# Patient Record
Sex: FEMALE | Race: WHITE | URBAN - METROPOLITAN AREA
[De-identification: names, ages, dates, MRNs, and addresses within clinical notes are randomized per-mention and may not be internally consistent; named-entity substitution may affect disease eponyms.]

---

## 2018-02-05 ENCOUNTER — OFFICE VISIT (OUTPATIENT)
Dept: OBGYN | Facility: CLINIC | Age: 28
End: 2018-02-05
Payer: COMMERCIAL

## 2018-02-05 VITALS
BODY MASS INDEX: 22.88 KG/M2 | WEIGHT: 134 LBS | HEIGHT: 64 IN | DIASTOLIC BLOOD PRESSURE: 68 MMHG | SYSTOLIC BLOOD PRESSURE: 112 MMHG

## 2018-02-05 DIAGNOSIS — N89.8 VAGINAL DRYNESS: ICD-10-CM

## 2018-02-05 DIAGNOSIS — N93.8 DUB (DYSFUNCTIONAL UTERINE BLEEDING): Primary | ICD-10-CM

## 2018-02-05 DIAGNOSIS — Z30.011 VISIT FOR ORAL CONTRACEPTIVE PRESCRIPTION: ICD-10-CM

## 2018-02-05 DIAGNOSIS — R68.82 DECREASED LIBIDO: ICD-10-CM

## 2018-02-05 DIAGNOSIS — Z30.46 NEXPLANON REMOVAL: ICD-10-CM

## 2018-02-05 PROCEDURE — 99214 OFFICE O/P EST MOD 30 MIN: CPT | Mod: 25 | Performed by: OBSTETRICS & GYNECOLOGY

## 2018-02-05 PROCEDURE — 11982 REMOVE DRUG IMPLANT DEVICE: CPT | Performed by: OBSTETRICS & GYNECOLOGY

## 2018-02-05 RX ORDER — LEVONORGESTREL AND ETHINYL ESTRADIOL 0.15-0.03
1 KIT ORAL DAILY
Qty: 84 TABLET | Refills: 4 | Status: SHIPPED | OUTPATIENT
Start: 2018-02-05

## 2018-02-05 NOTE — MR AVS SNAPSHOT
"              After Visit Summary   2/5/2018    Leyla Cross    MRN: [id number]           Patient Information     Date Of Birth          1990        Visit Information        Provider Department      2/5/2018 9:00 AM Sandy Schuler DO [facility]        Today's Diagnoses     DUB (dysfunctional uterine bleeding)    -  1    Nexplanon removal        Visit for oral contraceptive prescription        Vaginal dryness        Decreased libido           Follow-ups after your visit        Follow-up notes from your care team     Return in about 3 months (around 5/5/2018).      Who to contact     If you have questions or need follow up information about today's clinic visit or your schedule please contact [facility] directly at [phone].  Normal or non-critical lab and imaging results will be communicated to you by Kaboodlehart, letter or phone within 4 business days after the clinic has received the results. If you do not hear from us within 7 days, please contact the clinic through Kaboodlehart or phone. If you have a critical or abnormal lab result, we will notify you by phone as soon as possible.  Submit refill requests through KitNipBox or call your pharmacy and they will forward the refill request to us. Please allow 3 business days for your refill to be completed.          Additional Information About Your Visit        MyChart Information     KitNipBox lets you send messages to your doctor, view your test results, renew your prescriptions, schedule appointments and more. To sign up, go to [email or web address] . Click on \"Log in\" on the left side of the screen, which will take you to the Welcome page. Then click on \"Sign up Now\" on the right side of the page.     You will be asked to enter the access code listed below, as well as some personal information. Please follow the directions to create your username and password.     Your access code is: [email or web address]  Expires: " "2018 10:32 AM     Your access code will  in 90 days. If you need help or a new code, please call your [facility] clinic or [phone].        Care EveryWhere ID     This is your Care EveryWhere ID. This could be used by other organizations to access your [facility] medical records  [id number]        Your Vitals Were     Height BMI (Body Mass Index)                5' 4\" (1.626 m) 23 kg/m2           Blood Pressure from Last 3 Encounters:   18 112/68   10/17/16 122/72   16 118/70    Weight from Last 3 Encounters:   18 134 lb (60.8 kg)   10/17/16 126 lb (57.2 kg)   16 124 lb (56.2 kg)              We Performed the Following     REMOVAL NON-BIODEGRADABLE DRUG DELIVERY IMPLANT          Today's Medication Changes          These changes are accurate as of 18 10:33 AM.  If you have any questions, ask your nurse or doctor.               Start taking these medicines.        Dose/Directions    levonorgestrel-ethinyl estradiol 0.15-30 MG-MCG per tablet   Commonly known as:  BROOKE   Used for:  Visit for oral contraceptive prescription   Started by:  Sandy Schuler,         Dose:  1 tablet   Take 1 tablet by mouth daily   Quantity:  84 tablet   Refills:  4            Where to get your medicines      These medications were sent to [id number] [address] [address]     Phone:  [phone]     levonorgestrel-ethinyl estradiol 0.15-30 MG-MCG per tablet                Primary Care Provider Office Phone # Fax #    [facility] [phone]       [facility] [address] [facility] [address]  [facility] [address]        Equal Access to Services     ELOY WADSWORTH AH: Reyna cadeto Sorachel, waaxda luqadaha, qaybta kaalmada adelenka, santos patterson. So [facility] [phone].    ATENCIÓN: Si habla español, tiene a morales disposición " servicios gratuitos de asistencia lingüística. Jeet snow [phone].    We comply with applicable federal civil rights laws and [place] laws. We do not discriminate on the basis of race, color, national origin, age, disability, sex, sexual orientation, or gender identity.            Thank you!     Thank you for choosing [facility]  for your care. Our goal is always to provide you with excellent care. Hearing back from our patients is one way we can continue to improve our services. Please take a few minutes to complete the written survey that you may receive in the mail after your visit with us. Thank you!             Your Updated Medication List - Protect others around you: Learn how to safely use, store and throw away your medicines at www.disposemymeds.org.          This list is accurate as of 2/5/18 10:33 AM.  Always use your most recent med list.                   Brand Name Dispense Instructions for use Diagnosis    etonogestrel 68 MG Impl    IMPLANON/NEXPLANON     1 each by Subdermal route once        levonorgestrel-ethinyl estradiol 0.15-30 MG-MCG per tablet    BROOKE    84 tablet    Take 1 tablet by mouth daily    Visit for oral contraceptive prescription

## 2018-02-05 NOTE — PROGRESS NOTES
SUBJECTIVE:                                                   Leyla Cross is a 27 year old female who presents to clinic today for the following health issue(s):  Patient presents with:  Vaginal Problem: patient is following up on vaginal dryness. has changed diet but has not seen any changes. patient also c/o constant spotting on Nexplanon-possible removal.         HPI:  Having vaginal dryness. Yogurt, probiotics has not changed.   Feels like she does not lubricate with intercourse. Low libido. Occ discomfort with intercourse. Has had better libido in past. Uses lubricant with intercourse and occ during the day.  Did not trial the estrogen cream.     Having spotting and then period and repeats the pattern every week.   Wears pads/liners.   Uses aveeno body wash 5-6x/wk. No feminine wipes/baby wipes.      No hx heavy periods. When was teen had a lot of cramps, which improved as got older. Started OCPs at 18 due to period interruption with sports. Did depo provera for some time, then back to OCPs. Was then single for a while and was off. Periods were somewhat alternating between heavy and light flow. Had mirena IUD and had recurrent vaginitis.   Is over the spotting, wants to go to OCPs.  Does NOT want to get pregnant soon. Does pullout as well as Nexplanon.     Working full time and going to school. Feels stress in life.   No hx sexual abuse.    Review of PMH, SocHx, SurHx, FHx, medications completed. Epic updated.      No LMP recorded. Patient is not currently having periods (Reason: Birth Control)..   Patient is sexually active, .  Using Nexplanon for contraception.    reports that she has quit smoking. She has never used smokeless tobacco.    STD testing offered?  Declined    Health maintenance updated:  yes    Today's PHQ-2 Score: No flowsheet data found.  Today's PHQ-9 Score:   PHQ-9 SCORE 2016   Total Score 4     Today's TEODORO-7 Score:   TEODORO-7 SCORE 2016   Total Score 7       Problem list  "and histories reviewed & adjusted, as indicated.  Additional history: as documented.    Patient Active Problem List   Diagnosis     Nexplanon in place     Past Surgical History:   Procedure Laterality Date     Nexplanon        Social History   Substance Use Topics     Smoking status: Former Smoker     Smokeless tobacco: Never Used     Alcohol use No           Current Outpatient Prescriptions   Medication Sig     etonogestrel (IMPLANON/NEXPLANON) 68 MG IMPL 1 each by Subdermal route once     levonorgestrel-ethinyl estradiol (NORDETTE) 0.15-30 MG-MCG per tablet Take 1 tablet by mouth daily     No current facility-administered medications for this visit.      No Known Allergies    ROS:  12 point review of systems negative other than symptoms noted below.  Genitourinary: Painful [place], Spotting and Vaginal Dryness  Endocrine: Decreased Libido  Psychiatric: Anxiety    OBJECTIVE:     /68  Ht 5' 4\" (1.626 m)  Wt 134 lb (60.8 kg)  BMI 23 kg/m2  Body mass index is 23 kg/(m^2).    Exam:  Constitutional:  Appearance: Well nourished, well developed alert, in no acute distress  Chest:  Respiratory Effort:  Breathing unlabored  Lymphatic: Lymph Nodes:  No other lymphadenopathy present  Skin:General Inspection:  No rashes present, no lesions present, no areas of discoloration; Genitalia and Groin:  No rashes present, no lesions present, no areas of discoloration, no masses present.  Neurologic/Psychiatric:  Mental Status:  Oriented X3   Pelvic Exam:  External Genitalia:     Normal appearance for age, no discharge present, no tenderness present, no inflammatory lesions present, color normal  Vagina:     Normal vaginal vault without central or paravaginal defects, SMALL DARK BLOOD IN VAULT, no inflammatory lesions present, no masses present  Bladder:     Nontender to palpation  Urethra:   Urethral Body:  Urethra palpation normal, urethra structural support normal   Urethral Meatus:  No erythema or lesions " present  Cervix:     Appearance healthy, no lesions present, nontender to palpation, no bleeding present  Perineum:     Perineum within normal limits, no evidence of trauma, no rashes or skin lesions present  Anus:     Anus within normal limits, no hemorrhoids present  Inguinal Lymph Nodes:     No lymphadenopathy present  Pubic Hair:     Normal pubic hair distribution for age  Genitalia and Groin:     No rashes present, no lesions present, no areas of discoloration, no masses present         ASSESSMENT/PLAN:                                                        ICD-10-CM    1. DUB (dysfunctional uterine bleeding) N93.8 REMOVAL NON-BIODEGRADABLE DRUG DELIVERY IMPLANT   2. Nexplanon removal Z30.46 REMOVAL NON-BIODEGRADABLE DRUG DELIVERY IMPLANT   3. Visit for oral contraceptive prescription Z30.011 levonorgestrel-ethinyl estradiol (NORDETTE) 0.15-30 MG-MCG per tablet   4. Vaginal dryness N89.8    5. Decreased libido R68.82          -DUB, 2/2 Nexplanon. Discussed ways to try OCP taper, for example, to stop bleeding but is unlikely to be successful long term. Pt desires Nexplanon out and start OCPs. Has tried many different meds in past as noted above with various SE.  Discussed Paragard IUD for contraception without the potential hormonal SE/risks, emily in light of her complaints. However, she wants OCPs. Removing constant spotting/bleeding should eliminate excess vaginal irritation. Discussed cleansing, is not over doing it as it is.   Reviweed risks and benefits of OCPS, including VTE risk. Start right away. Back up x 1wk.  -Libido low. Discussed potential med involvement but also differing life situations-stress of working full time and school, for example- which may impact. Will refer to sexual med clinic at the .  -Vaginal dryness. May be exacerbated by Nexplanon and spotting. Will see how these change. Never did the trial vaginal estrogen. Will hold off while changing contraceptives. May revisit in 3mo. May try  RepHresh. Cont lubricant use.   -See Nexplanon removal note below.     Sandy Schuler,   [facility]            INDICATIONS:                                                      Leyla is here today for removal of Nexplanon contraceptive implant.     Is a pregnancy test required: No.  Was a consent obtained?  Yes    Today's PHQ-2 Score: No flowsheet data found.    PROCEDURE:                                                      Patient was placed in dorsal supine position with left arm abducted and externally rotated. Nexplanon was palpated under skin. The area was cleansed with betadine. The distal site was injected with 2 ml of 1% plain lidocaine.  While pushing down on the proximal end, 2 mm incision was made over the distal implant with a scalpel. The implant was grasped with a mosquito forceps and removed intact. The skin was closed with Steristrip. A pressure bandage was placed for the next 12-24 hours.  She tolerated the procedure well. There were no complications. Patient was discharged in stable condition.    Call if bleeding, pain or fever occur., Birth control counseling given. and Will start OCPs for contraception. Backup x 1wk    Sandy Schuler, DO

## 2019-02-13 ENCOUNTER — TELEPHONE (OUTPATIENT)
Dept: OBGYN | Facility: CLINIC | Age: 29
End: 2019-02-13

## 2019-02-13 NOTE — LETTER
March 1, 2019      Leyla Cross  [address]   [place] [address]        Dear Leyla,       You were seen in our office on 2/5/2018 . We are reaching out to you because we do not have record of a pap test in the [facility] system. We track this to make sure you are getting proper screening for cervical cancers.     If you had this at another clinic outside of [facility], we ask that you call back to relay this information; where you had this test, approximate date and the result.     Otherwise we ask that you please make an appointment with our clinic for an exam/annual, or let us know if you will be seeing another clinic for our tracking purposes.        Sincerely,        Sandy Lopes Masters, DO

## 2019-02-13 NOTE — LETTER
February 13, 2019      Leyla Cross  [address]   [place] [address]        Dear Leyla,     You were seen in our office on 2/5/2018 . We are reaching out to you because we do not have record of a pap test in the [facility] system. We track this to make sure you are getting proper screening for cervical. We also do not see an updated annual exam and in order to refill your prescriptions you must have an up-to-date annual and pap smear.    If you had this at another clinic outside of [facility], we ask that you call back to relay this information; where you had this test, approximate date and the result.     Otherwise we ask that you please make an appointment with our clinic for an exam, or let us know if you will be seeing another clinic for our tracking purposes.              Sincerely,        Sandy Lopes Masters, DO

## 2019-02-13 NOTE — TELEPHONE ENCOUNTER
Panel Management Review    Date of last visit with a [facility] provider: Masters on 2/5/18.  Date of next visit with a [facility] provider: None.    Health Maintenance List    Health Maintenance   Topic Date Due     HPV IMMUNIZATION (1 - Female 3-dose series) 05/28/2003     DEPRESSION ACTION PLAN  05/28/2010     HIV SCREEN (SYSTEM ASSIGNED)  05/28/2010     PAP SCREENING Q3 YR (SYSTEM ASSIGNED)  05/28/2013     PHQ-9 Q6 MONTHS  03/26/2017     DTAP/TDAP/TD IMMUNIZATION (1 - Tdap) 05/28/2017     INFLUENZA VACCINE (1) 09/01/2018     ZOSTER IMMUNIZATION (1 of 2) 05/28/2042     IPV IMMUNIZATION  Aged Out     MENINGITIS IMMUNIZATION  Aged Out       Composite cancer screening  Chart review shows that this patient is due/due soon for the following Pap Smear  No results found for: PAP  Past Surgical History:   Procedure Laterality Date     Nexplanon         Is hysterectomy listed in surgical history? No   Is mastectomy listed in surgical history? No     Summary:    Patient is due/failing the following:   Pap Smear    Action needed: Routed to provider for review. and Patient needs office visit for annual/pap.    Type of outreach:  Sent letter.      Staff Signature:  Shakila Rivera CMA on 2/13/2019 at 3:05 PM